# Patient Record
(demographics unavailable — no encounter records)

---

## 2025-05-19 NOTE — PHYSICAL EXAM
[No Acute Distress] : no acute distress [Well-Appearing] : well-appearing [Normal Voice/Communication] : normal voice/communication [Normal Outer Ear/Nose] : the outer ears and nose were normal in appearance [Normal Oropharynx] : the oropharynx was normal [Normal TMs] : both tympanic membranes were normal [No Lymphadenopathy] : no lymphadenopathy [Supple] : supple [No Respiratory Distress] : no respiratory distress  [No Accessory Muscle Use] : no accessory muscle use [Normal Rate] : normal rate  [Regular Rhythm] : with a regular rhythm [Soft] : abdomen soft [Non Tender] : non-tender [Normal Bowel Sounds] : normal bowel sounds [de-identified] : b/l lower rhonchi improved with cough

## 2025-05-19 NOTE — INTERPRETER SERVICES
[Language Line ] : provided by [employer]   [Time Spent: ____ minutes] : Total time spent using  services: [unfilled] minutes. The patient's primary language is not English thus required  services. [Interpreters_IDNumber] : [id number] [Interpreters_FullName] : Marina [TWNoteComboBox1] : Citizen of [place]

## 2025-05-19 NOTE — HISTORY OF PRESENT ILLNESS
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    She came with daughter today.   Last visit in 2023. She lives in [place] and here for vacation since 04/2025 and she will return 7/16/25. She has PCP and Cardiologist at [place]. While in [place] she self d/c her Metoprolol as she thought it was making her lightheaded.     5/6/25- 5/7 -[facility]: for chest pain described as burning of her skin for months, EKG showed NSR, no significant changes noted compared to prior EKG. In the ED, pt with stable VS. Labs non actionable, trop 9 and 7. EKG w/o new ischemic changes. In the CDU echo was unchanged from prior. Cardiac ct negative for CAD. pt was discharged home with outpt followup.   Today, she still has intermittent left chest pain and feels like burning radiates to left hand. This is the same pain that she went to hospital. Symptoms have been ongoing since ICD placement. Pain improves with Tylenol. She has cardio appt 5/29/25.   Lower back pain: She also c/o long hx of lumbar pain for several years and would like osteoporosis test done. Denied muscle weakness, bowel/bladder incontinence.   Cough: She also has been coughing and throat discomfort x 2wks. No seasonal allergy in [facility] and not sure if she is allergic to pollen in [place] Denied fever, chills, headache. No sick exposure at home. Denied SOB, fever, chill. No sick exposure.

## 2025-05-19 NOTE — REVIEW OF SYSTEMS
[Fever] : no fever [Chills] : no chills [Fatigue] : no fatigue [Shortness Of Breath] : no shortness of breath [Wheezing] : no wheezing [Cough] : no cough [Abdominal Pain] : no abdominal pain [Nausea] : no nausea [Vomiting] : no vomiting [FreeTextEntry4] : see HPI [FreeTextEntry5] : see HPI [FreeTextEntry9] : lower back pain.

## 2025-05-19 NOTE — PLAN
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  Chest Pain/ Hypertrophic CM  - describes as a burning pain of left chest radiates to left hand. Same pain as 2 wks ago that led to ER visit.  - EKG showed T inversion, Anterior/lateral and inferior ischemia. No change compared to prior EKG. - s/p AICD, BNP  645, trop neg. Echo (5/7/25) was unchanged from prior. Cardiac CT negative for CAD. - c/w ASA, Atorvastatin, furosemide and metoprolol  - Educated patient go to ER if sudden onset of severe chest pain, with SOB, dyspnea on exertion, excessive sweating, dizziness, pain radiates to neck, jaw or arm. - F/u with cardio on 5/29/25   Hypertension  - Well controlled  - c/w furosemide and metoprolol   Lower back pain - chronic pain. No red flag symptoms - Will order lumbar sacral Xray and Dexa scan.  - Conservative management. Physical therapy and continue to take tylenol prn for pain  Cough/sore throat  - Ongoing symptoms for 2wks. b/l lung rhonchi. Denied fever, chills, SOB, MCKINNEY. Likely post viral bronchitis.  - Will get Chest Xray and take Augmentin.  DM2 - c/w metformin - A1C 6.6 (5/6/25).   f/u in 6-8wks.